# Patient Record
Sex: MALE | Race: BLACK OR AFRICAN AMERICAN | NOT HISPANIC OR LATINO | Employment: FULL TIME | URBAN - METROPOLITAN AREA
[De-identification: names, ages, dates, MRNs, and addresses within clinical notes are randomized per-mention and may not be internally consistent; named-entity substitution may affect disease eponyms.]

---
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
## 2018-05-06 ENCOUNTER — NURSE TRIAGE (OUTPATIENT)
Dept: ADMINISTRATIVE | Facility: CLINIC | Age: 18
End: 2018-05-06

## 2018-05-06 NOTE — TELEPHONE ENCOUNTER
"  Reason for Disposition   Symptoms of a Sexually Transmitted Disease or Infection (STD, STI)   Penis or scrotal symptoms (i.e., rash, sores, itching)   [1] Tiny water blisters rash AND [2] 3 or more    Answer Assessment - Initial Assessment Questions  1. SYMPTOMS: "Do you have any symptoms of an STD?" If so, ask: "What are they?"      Small bumps on penis and scrotum  2. TYPE: "What STD do you have questions about?"        3. EXPOSURE: "Were you exposed to an STD?" If so, ask: "When and what kind of exposure?"      Unknown. His sex partners have been virgins  4. PREVENTION: "Do you have questions about preventing AIDS and other STDs?"      no  5. PREGNANCY: "Is there any chance you are pregnant?" "When was your last menstrual period?"      N/a    Protocols used: ST STD [id number], ST STDS - GUIDELINE [id number], ST PENIS AND SCROTUM SYMPTOMS-A-    Patient states he has been having unprotected sex and now he has itchy bumps on his penis and scrotum. Patient advised to see md today for treatment. He verbalized understanding.   "

## 2018-05-06 NOTE — TELEPHONE ENCOUNTER
LM x2 at 120pm at [phone]    Reason for Disposition   Message left on identified answering machine.    Protocols used: ST NO CONTACT OR DUPLICATE CONTACT CALL-A-AH

## 2019-08-05 PROBLEM — F41.9 ANXIETY: Status: ACTIVE | Noted: 2019-08-05

## 2019-08-05 PROBLEM — R07.9 CHEST PAIN: Status: ACTIVE | Noted: 2019-08-05

## 2019-08-05 PROBLEM — R94.31 ABNORMAL EKG: Status: ACTIVE | Noted: 2019-08-05

## 2023-08-04 PROBLEM — I30.0 ACUTE IDIOPATHIC PERICARDITIS: Status: ACTIVE | Noted: 2023-08-04

## 2023-08-05 PROBLEM — I31.9 PERICARDITIS: Status: ACTIVE | Noted: 2023-08-05
